# Patient Record
Sex: MALE | URBAN - METROPOLITAN AREA
[De-identification: names, ages, dates, MRNs, and addresses within clinical notes are randomized per-mention and may not be internally consistent; named-entity substitution may affect disease eponyms.]

---

## 2019-05-13 ENCOUNTER — NURSE TRIAGE (OUTPATIENT)
Dept: NURSING | Facility: CLINIC | Age: 34
End: 2019-05-13

## 2019-05-13 NOTE — TELEPHONE ENCOUNTER
"Patient calling \"my pee pee is [id number].\"   Patient has called multiple times providing different names with the same symptom. Stating he does not have a phone number.   Advised patient he could schedule appointment or come in to be evaluated.       Michelle Verduzco RN  [facility]      "